# Patient Record
Sex: MALE | Race: WHITE | NOT HISPANIC OR LATINO | Employment: FULL TIME | URBAN - METROPOLITAN AREA
[De-identification: names, ages, dates, MRNs, and addresses within clinical notes are randomized per-mention and may not be internally consistent; named-entity substitution may affect disease eponyms.]

---

## 2017-03-17 ENCOUNTER — TRANSCRIBE ORDERS (OUTPATIENT)
Dept: ADMINISTRATIVE | Facility: HOSPITAL | Age: 62
End: 2017-03-17

## 2017-03-17 DIAGNOSIS — M54.2 CERVICAL SPINE PAIN: Primary | ICD-10-CM

## 2017-03-23 ENCOUNTER — HOSPITAL ENCOUNTER (OUTPATIENT)
Dept: MRI IMAGING | Facility: HOSPITAL | Age: 62
Discharge: HOME | End: 2017-03-23
Payer: COMMERCIAL

## 2017-03-23 DIAGNOSIS — M54.2 CERVICAL SPINE PAIN: ICD-10-CM

## 2017-03-23 PROCEDURE — 72141 MRI NECK SPINE W/O DYE: CPT

## 2017-10-19 ENCOUNTER — TRANSCRIBE ORDERS (OUTPATIENT)
Dept: ADMINISTRATIVE | Facility: HOSPITAL | Age: 62
End: 2017-10-19

## 2017-10-19 DIAGNOSIS — G45.9 TRANSIENT CEREBRAL ISCHEMIA, UNSPECIFIED TYPE: Primary | ICD-10-CM

## 2017-10-23 ENCOUNTER — HOSPITAL ENCOUNTER (OUTPATIENT)
Dept: ULTRASOUND IMAGING | Facility: HOSPITAL | Age: 62
Discharge: HOME | End: 2017-10-23
Payer: COMMERCIAL

## 2017-10-23 DIAGNOSIS — G45.9 TRANSIENT CEREBRAL ISCHEMIA, UNSPECIFIED TYPE: ICD-10-CM

## 2017-10-23 PROCEDURE — 93880 EXTRACRANIAL BILAT STUDY: CPT

## 2018-10-29 ENCOUNTER — TRANSCRIBE ORDERS (OUTPATIENT)
Dept: ADMINISTRATIVE | Facility: HOSPITAL | Age: 63
End: 2018-10-29

## 2018-10-29 DIAGNOSIS — M54.50 LOW BACK PAIN WITHOUT SCIATICA, UNSPECIFIED BACK PAIN LATERALITY, UNSPECIFIED CHRONICITY: Primary | ICD-10-CM

## 2018-11-02 ENCOUNTER — HOSPITAL ENCOUNTER (OUTPATIENT)
Dept: MRI IMAGING | Facility: HOSPITAL | Age: 63
Discharge: HOME | End: 2018-11-02
Attending: ANESTHESIOLOGY
Payer: COMMERCIAL

## 2018-11-02 DIAGNOSIS — M54.50 LOW BACK PAIN WITHOUT SCIATICA, UNSPECIFIED BACK PAIN LATERALITY, UNSPECIFIED CHRONICITY: ICD-10-CM

## 2018-11-02 PROCEDURE — 72148 MRI LUMBAR SPINE W/O DYE: CPT

## 2019-08-21 ENCOUNTER — OFFICE VISIT (OUTPATIENT)
Dept: FAMILY MEDICINE CLINIC | Facility: CLINIC | Age: 64
End: 2019-08-21
Payer: COMMERCIAL

## 2019-08-21 VITALS
DIASTOLIC BLOOD PRESSURE: 82 MMHG | OXYGEN SATURATION: 97 % | WEIGHT: 174.3 LBS | SYSTOLIC BLOOD PRESSURE: 128 MMHG | HEART RATE: 81 BPM | HEIGHT: 71 IN | BODY MASS INDEX: 24.4 KG/M2

## 2019-08-21 DIAGNOSIS — Z11.59 ENCOUNTER FOR HEPATITIS C SCREENING TEST FOR LOW RISK PATIENT: Primary | ICD-10-CM

## 2019-08-21 DIAGNOSIS — S23.9XXA THORACIC SPRAIN: ICD-10-CM

## 2019-08-21 DIAGNOSIS — I10 ESSENTIAL HYPERTENSION: ICD-10-CM

## 2019-08-21 DIAGNOSIS — R35.1 NOCTURIA: ICD-10-CM

## 2019-08-21 PROCEDURE — 3008F BODY MASS INDEX DOCD: CPT | Performed by: FAMILY MEDICINE

## 2019-08-21 PROCEDURE — 3074F SYST BP LT 130 MM HG: CPT | Performed by: FAMILY MEDICINE

## 2019-08-21 PROCEDURE — 99213 OFFICE O/P EST LOW 20 MIN: CPT | Performed by: FAMILY MEDICINE

## 2019-08-21 PROCEDURE — 1036F TOBACCO NON-USER: CPT | Performed by: FAMILY MEDICINE

## 2019-08-21 RX ORDER — OMEPRAZOLE 40 MG/1
20 CAPSULE, DELAYED RELEASE ORAL DAILY
COMMUNITY
End: 2019-08-21 | Stop reason: CLARIF

## 2019-08-21 RX ORDER — FLUTICASONE PROPIONATE 50 MCG
2 SPRAY, SUSPENSION (ML) NASAL DAILY
COMMUNITY

## 2019-08-21 RX ORDER — TRIAMTERENE AND HYDROCHLOROTHIAZIDE 37.5; 25 MG/1; MG/1
1 TABLET ORAL DAILY
Refills: 3 | COMMUNITY
Start: 2019-08-06 | End: 2019-09-12 | Stop reason: SDUPTHER

## 2019-08-21 RX ORDER — OMEPRAZOLE 20 MG/1
20 CAPSULE, DELAYED RELEASE ORAL DAILY
COMMUNITY

## 2019-08-21 RX ORDER — NAPROXEN SODIUM 550 MG/1
550 TABLET ORAL 2 TIMES DAILY WITH MEALS
Qty: 60 TABLET | Refills: 0 | Status: SHIPPED | OUTPATIENT
Start: 2019-08-21

## 2019-08-21 RX ORDER — TIZANIDINE 4 MG/1
4 TABLET ORAL
Qty: 15 TABLET | Refills: 1 | Status: SHIPPED | OUTPATIENT
Start: 2019-08-21 | End: 2019-09-21

## 2019-08-21 RX ORDER — ASPIRIN 81 MG/1
81 TABLET, CHEWABLE ORAL DAILY
COMMUNITY
Start: 2017-10-14

## 2019-08-21 NOTE — PATIENT INSTRUCTIONS
Thoracic Back Strain   WHAT YOU NEED TO KNOW:   A thoracic back strain is a muscle or tendon injury in your upper or middle back  You may have pain, muscle spasms, swelling, or stiffness  The strain may be caused by a back injury, poor posture, or lifting a heavy object  Too much activity can also cause a strain  A mild strain may cause minor pain that goes away in a few days  A more severe strain may cause the muscle or tendon to tear  There is a very small chance you may need surgery to fix the tear  DISCHARGE INSTRUCTIONS:   Medicines: You may need any of the following:  · Prescription pain medicine  may be given  Ask how to take this medicine safely  Do not wait until the pain is severe to take your medicine  · NSAIDs , such as ibuprofen, help decrease swelling, pain, and fever  This medicine is available with or without a doctor's order  NSAIDs can cause stomach bleeding or kidney problems in certain people  If you take blood thinner medicine, always ask your healthcare provider if NSAIDs are safe for you  Always read the medicine label and follow directions  · Muscle relaxers  help prevent or treat spasms  · Take your medicine as directed  Contact your healthcare provider if you think your medicine is not helping or if you have side effects  Tell him or her if you are allergic to any medicine  Keep a list of the medicines, vitamins, and herbs you take  Include the amounts, and when and why you take them  Bring the list or the pill bottles to follow-up visits  Carry your medicine list with you in case of an emergency  Call 911 for any of the following:   · You have chest pain or shortness of breath  Return to the emergency department if:   · You have severe pain, or pain that spreads from your back to other areas  · You have new or increased swelling or redness in the injured area  Contact your healthcare provider if:   · You have questions or concerns about your condition or care      Follow up with your healthcare provider as directed: You may need more tests to check for other injuries or to see how your injury is healing  You may also need to see a specialist  Write down your questions so you remember to ask them during your visits  Self-care:   · Rest as directed  Move slowly and carefully  Do not lift heavy objects  · Apply ice or heat as directed  Ice decreases pain and swelling and may help decrease tissue damage  Heat helps decrease muscle spasms  Your healthcare provider may tell you to apply only ice for the first 24 hours to help reduce swelling  Apply ice or heat to the area for 20 minutes every hour, or as directed  Ask how many times to do this each day, and for how many days  · Use an elastic wrap or back brace as directed  These will help keep the injured area from moving so it can heal      · Go to physical therapy as directed  A physical therapist can teach you exercises to help strengthen your back  They can also teach you safe ways to bend and move so you do not cause more injury  Prevent another thoracic back strain:   · Lift objects carefully  Ask someone to help you lift heavy objects  If you must lift an object by yourself, do not use your back muscles to lift  Lift with your legs  · Check your posture  Keep your upper body lifted and your head up  Poor posture can cause back strain or make it worse  Adjust your position if you work in front of a computer  You may need arm or wrist supports or change the height of your chair  · Exercise as directed  Exercise can help strengthen your muscles and make you more flexible  Do not exercise or play sports when you are tired  Always warm up before you start and cool down when you finish  · Stretch your muscles as directed  Keep your muscles limber by stretching every day  Stretch before you exercise    © 2017 [employer] Information is for End User's use only and may not be sold, redistributed or otherwise used for commercial purposes  All illustrations and images included in CareNotes® are the copyrighted property of [employer]  or [employer]  The above information is an  only  It is not intended as medical advice for individual conditions or treatments  Talk to your doctor, nurse or pharmacist before following any medical regimen to see if it is safe and effective for you

## 2019-08-21 NOTE — PROGRESS NOTES
Assessment/Plan:    Thoracic sprain  Patient has a thoracic strain and sprain  I started the patient on muscle relaxers and anti-inflammatory medication  He was advised to use moist heat to the affected area and rest   I expect this will likely take several weeks to resolve  I advised the patient to return to the office if no improvement or worsens  At this time, I did not order any imaging studies  If he fails to improve, imaging studies may be indicated at a later date  Essential hypertension  Patient has hypertension  The patient's blood pressure is reasonably well controlled  He has not had blood work done for quite a while  Fasting blood work was ordered  He will continue triamterene-hydrochlorothiazide for treatment of his hypertension  Diagnoses and all orders for this visit:    Encounter for hepatitis C screening test for low risk patient  -     Hepatitis C antibody; Future    Thoracic sprain  -     tiZANidine (ZANAFLEX) 4 mg tablet; Take 1 tablet (4 mg total) by mouth daily at bedtime for 31 days  -     naproxen sodium (ANAPROX) 550 mg tablet; Take 1 tablet (550 mg total) by mouth 2 (two) times a day with meals    Essential hypertension  -     Comprehensive metabolic panel; Future    Nocturia  -     PSA Total, Diagnostic; Future    Other orders  -     Discontinue: omeprazole (PRILOSEC) 40 MG capsule; Take 20 mg by mouth daily  -     Multiple Vitamins-Minerals (MULTIVITAMIN ADULTS 50+ PO); Take 1 capsule by mouth daily  -     aspirin 81 mg chewable tablet; Chew 81 mg daily  -     fluticasone (FLONASE) 50 mcg/act nasal spray; 2 sprays into each nostril daily  -     triamterene-hydrochlorothiazide (MAXZIDE-25) 37 5-25 mg per tablet; Take 1 tablet by mouth daily  -     omeprazole (PriLOSEC) 20 mg delayed release capsule; Take 20 mg by mouth daily          Subjective:      Patient ID: Rody Black is a 59 y o  male  This patient is a 41-year-old white male who retired 1 week ago    He presents to the office today complaining of pain in the area of the right shoulder blade  He describes it as a sharp grabbing pain  He tells me the pain is such that he cannot sleep  He tells me in the morning, when he awakens, he will feel a pressure sensation between his shoulder blades  If he turns or twists he gets pain  This occurs throughout the day  Symptoms began 1 week ago  He denies any radiation of the pain  He denies any trauma  He has been outside doing work in his yard  The following portions of the patient's history were reviewed and updated as appropriate: allergies, current medications, past family history, past medical history, past social history, past surgical history and problem list     Review of Systems   Respiratory: Negative for apnea, shortness of breath and wheezing  Cardiovascular: Negative for chest pain and palpitations  Gastrointestinal: Negative for abdominal distention, abdominal pain, blood in stool, diarrhea and vomiting  Objective:      /82 (BP Location: Left arm, Patient Position: Sitting, Cuff Size: Adult)   Pulse 81   Ht 5' 10 5" (1 791 m)   Wt 79 1 kg (174 lb 4 8 oz)   SpO2 97%   BMI 24 66 kg/m²          Physical Exam   Constitutional: He appears well-developed and well-nourished  No distress  HENT:   Head: Normocephalic and atraumatic  Right Ear: External ear normal    Left Ear: External ear normal    Mouth/Throat: Oropharynx is clear and moist  No oropharyngeal exudate  Eyes: Pupils are equal, round, and reactive to light  Conjunctivae are normal  No scleral icterus  Neck: Neck supple  No tracheal deviation present  No thyromegaly present  Cardiovascular: Normal rate and regular rhythm  Exam reveals no gallop and no friction rub  No murmur heard  Pulmonary/Chest: Effort normal and breath sounds normal  No stridor  No respiratory distress  He has no wheezes  He has no rales     Negative for pleuritic friction rub Musculoskeletal:   There is evidence of muscle spasm present along the right paraspinal thoracic musculature from T4-T12  There is tenderness also noted to palpation in this area  There is full cervical range of motion  There is full lumbar range of motion  Lymphadenopathy:     He has no cervical adenopathy  Neurological:   Deep tendon reflexes are 2/4  Motor strength is 5/5  Cervical compression test is negative  Skin: He is not diaphoretic  Vitals reviewed  extremities:  Without cyanosis, clubbing, or edema  There was no calf tenderness  Homans sign was negative    I also measured the circumference of each leg and they were equal

## 2019-08-22 NOTE — ASSESSMENT & PLAN NOTE
Patient has a thoracic strain and sprain  I started the patient on muscle relaxers and anti-inflammatory medication  He was advised to use moist heat to the affected area and rest   I expect this will likely take several weeks to resolve  I advised the patient to return to the office if no improvement or worsens  At this time, I did not order any imaging studies  If he fails to improve, imaging studies may be indicated at a later date

## 2019-08-22 NOTE — ASSESSMENT & PLAN NOTE
Patient has hypertension  The patient's blood pressure is reasonably well controlled  He has not had blood work done for quite a while  Fasting blood work was ordered  He will continue triamterene-hydrochlorothiazide for treatment of his hypertension

## 2019-08-23 ENCOUNTER — TELEPHONE (OUTPATIENT)
Dept: FAMILY MEDICINE CLINIC | Facility: CLINIC | Age: 64
End: 2019-08-23

## 2019-08-26 ENCOUNTER — DOCUMENTATION (OUTPATIENT)
Dept: FAMILY MEDICINE CLINIC | Facility: CLINIC | Age: 64
End: 2019-08-26

## 2019-08-26 NOTE — PROGRESS NOTES
PSA 0 8   BLOOD SUGAR 94  KIDNEY FUNCTION AND LIVER NORMAL    LEFT MESSAGE FOR PT TO CALL OFFICE FOR RESULTS

## 2019-09-12 DIAGNOSIS — I10 ESSENTIAL HYPERTENSION: Primary | ICD-10-CM

## 2019-09-12 RX ORDER — TRIAMTERENE AND HYDROCHLOROTHIAZIDE 37.5; 25 MG/1; MG/1
1 TABLET ORAL DAILY
Qty: 90 TABLET | Refills: 3 | Status: SHIPPED | OUTPATIENT
Start: 2019-09-12